# Patient Record
Sex: MALE | Race: AMERICAN INDIAN OR ALASKA NATIVE
[De-identification: names, ages, dates, MRNs, and addresses within clinical notes are randomized per-mention and may not be internally consistent; named-entity substitution may affect disease eponyms.]

---

## 2017-08-12 ENCOUNTER — HOSPITAL ENCOUNTER (EMERGENCY)
Dept: HOSPITAL 31 - C.ER | Age: 37
Discharge: HOME | End: 2017-08-12
Payer: MEDICAID

## 2017-08-12 VITALS
OXYGEN SATURATION: 97 % | HEART RATE: 94 BPM | SYSTOLIC BLOOD PRESSURE: 121 MMHG | DIASTOLIC BLOOD PRESSURE: 80 MMHG | RESPIRATION RATE: 16 BRPM | TEMPERATURE: 98.3 F
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
## 2017-08-12 DIAGNOSIS — F10.129: Primary | ICD-10-CM

## 2017-08-12 NOTE — C.PDOC
History Of Present Illness


Patient presents to the ED for evaluation of acute alcohol intoxication for an 

unknown duration. Patient admits to drinking earlier today. He denies suicidal/

homicidal ideation and has no physical complaints at this time. 


Time Seen by Provider: 08/12/17 02:10


Chief Complaint (Nursing): Substance Abuse


History Per: Patient


History/Exam Limitations: intoxication


Onset/Duration Of Symptoms: Unknown


Current Symptoms Are (Timing): Still Present


Suicide/Self Injury Attempted (Context): None


Modifying Factor(s): Alcohol


Severity: Mild


Pain Scale Rating Of: 3


Associated Symptoms: denies: Suicidal Thoughts, Suicidal Plan


Involuntary Hold By: None


Recent travel outside of the [place]: No


Additional History Per: Patient





Past Medical History


Reviewed: Historical Data, Nursing Documentation, Vital Signs


Vital Signs: 


 Last Vital Signs











Temp  98.2 F   08/12/17 02:09


 


Pulse  110 H  08/12/17 02:09


 


Resp  14   08/12/17 02:09


 


BP  140/70   08/12/17 02:09


 


Pulse Ox  96   08/12/17 04:52














- Medical History


PMH: No Chronic Diseases


Surgical History: No Surg Hx


Family History: States: Unknown Family Hx





Review Of Systems


Constitutional: Positive for: Other (+ETOH intoxication)


Cardiovascular: Negative for: Chest Pain, Palpitations


Respiratory: Negative for: Cough, Shortness of Breath


Gastrointestinal: Negative for: Nausea, Vomiting, Abdominal Pain


Skin: Negative for: Rash, Lesions, Jaundice, Bruising


Neurological: Negative for: Weakness, Numbness


Psych: Negative for: Suicidal ideation





Physical Exam





- Physical Exam


Appears: No Acute Distress, Other (visibly intoxicated )


Skin: Warm, Dry


Head: Normacephalic


Eye(s): bilateral: Normal Inspection


Oral Mucosa: Moist, Other (alcohol on breath )


Neck: Supple


Chest: Symmetrical, No Deformity, No Tenderness


Cardiovascular: Rhythm Regular, No Murmur


Respiratory: No Rales, No Rhonchi, No Wheezing


Extremity: Normal ROM, Capillary Refill (less than 2 seconds )


Neurological/Psych: Other (arousable to touch and verbal stimuli )


Gait: Unsteady





ED Course And Treatment


O2 Sat by Pulse Oximetry: 96 (on RA)


Pulse Ox Interpretation: Normal


Reevaluation Time: 05:14


Reassessment Condition: Improved





Disposition


Counseled Patient/Family Regarding: Studies Performed, Diagnosis, Need For 

Followup





- Disposition


Referrals: 


[facility] at [facility] [Outside]


Disposition Time: 02:12


Condition: FAIR


Instructions:  Alcohol Intoxication (DC)


Forms:  [facility] (English)





- Clinical Impression


Clinical Impression: 


 Alcohol intoxication








- Scribe Statement


The provider has reviewed the documentation as recorded by the Scribe (Micheline Leonard)


Provider Attestation: 








All medical record entries made by the Scribe were at my direction and 

personally dictated by me. I have reviewed the chart and agree that the record 

accurately reflects my personal performance of the history, physical exam, 

medical decision making, and the department course for this patient. I have 

also personally directed, reviewed, and agree with the discharge instructions 

and disposition.

## 2019-02-20 ENCOUNTER — HOSPITAL ENCOUNTER (EMERGENCY)
Dept: HOSPITAL 31 - C.ER | Age: 39
Discharge: HOME | End: 2019-02-20
Payer: COMMERCIAL

## 2019-02-20 VITALS
RESPIRATION RATE: 16 BRPM | DIASTOLIC BLOOD PRESSURE: 63 MMHG | HEART RATE: 68 BPM | TEMPERATURE: 97.7 F | SYSTOLIC BLOOD PRESSURE: 110 MMHG

## 2019-02-20 VITALS — OXYGEN SATURATION: 97 %

## 2019-02-20 DIAGNOSIS — S80.11XA: ICD-10-CM
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
## 2019-02-20 DIAGNOSIS — S80.811A: Primary | ICD-10-CM
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
## 2019-02-20 PROCEDURE — 90471 IMMUNIZATION ADMIN: CPT

## 2019-02-20 PROCEDURE — 90715 TDAP VACCINE 7 YRS/> IM: CPT

## 2019-02-20 PROCEDURE — 99285 EMERGENCY DEPT VISIT HI MDM: CPT

## 2019-02-20 PROCEDURE — 96372 THER/PROPH/DIAG INJ SC/IM: CPT

## 2019-02-20 PROCEDURE — 73590 X-RAY EXAM OF LOWER LEG: CPT

## 2019-02-20 NOTE — C.PDOC
History Of Present Illness


38 year old male presents to the ED for evaluation of right lower leg injury. 

Patient reports that while at work tonight his right leg was caught in the 

middle of steel plates. Patient denies weakness, numbness, LOC, headache. 


Chief Complaint (Nursing): Lower Extremity Problem/Injury


History Per: Patient


History/Exam Limitations: no limitations


Onset/Duration Of Symptoms: Hrs


Current Symptoms Are (Timing): Still Present


Recent travel outside of the [place]: No


Additional History Per: Patient





- Ankle/Foot


Description Of Injury: Struck Against Object


Currently Unable To: Bear Weight, Bend Or Move





Past Medical History


Reviewed: Historical Data, Nursing Documentation, Vital Signs


Vital Signs: 





                                Last Vital Signs











Temp  98.1 F   02/20/19 00:09


 


Pulse  85   02/20/19 00:09


 


Resp  20   02/20/19 00:09


 


BP  133/90   02/20/19 00:09


 


Pulse Ox  99   02/20/19 00:09














- Medical History


PMH: Seizures


Surgical History: No Surg Hx


Family History: States: Unknown Family Hx





- Social History


Hx Alcohol Use: Yes


Hx Substance Use: No





- Immunization History


Hx Tetanus Toxoid Vaccination: No


Hx Influenza Vaccination: No


Hx Pneumococcal Vaccination: No





Review Of Systems


Constitutional: Negative for: Fever, Chills


Respiratory: Negative for: Cough, Shortness of Breath


Gastrointestinal: Negative for: Nausea, Vomiting, Abdominal Pain


Musculoskeletal: Positive for: Leg Pain, Foot Pain


Skin: Positive for: Other (abrasion)


Neurological: Negative for: Weakness, Numbness, Headache, Dizziness





Physical Exam





- Physical Exam


Appears: Non-toxic, No Acute Distress


Skin: Normal Color, Warm, Dry


Head: Atraumatic, Normacephalic


Eye(s): bilateral: Normal Inspection


Neck: Normal ROM, Supple


Chest: Symmetrical


Cardiovascular: Rhythm Regular


Respiratory: Normal Breath Sounds, No Rales, No Rhonchi, No Wheezing


Extremity: No Normal ROM (right leg due to pain), Tenderness (right lower leg 

dorsal aspect), Capillary Refill (< 2 seconds), No Deformity, No Swelling, Other

(4.5 cm abrasion dorsal aspect right lower leg)


Pulses: Left Dorsalis Pedis: Normal, Right Dorsalis Pedis: Normal


Neurological/Psych: Oriented x3, Normal Speech, Normal Cognition, Normal Motor, 

Normal Sensation


Gait: Unable To Assess





ED Course And Treatment


O2 Sat by Pulse Oximetry: 99 (ON RA)


Pulse Ox Interpretation: Normal





- Other Rad


  ** Rt. lower leg


X-Ray: Interpreted by Me, Viewed By Me


Interpretation: NO Fracture





Medical Decision Making


Medical Decision Making: 


Plan:


* Tetanus immunization


* Bacitracin


* Toradol 60 mg IM


* Right leg X-Ray








Disposition


Counseled Patient/Family Regarding: Diagnosis





- Disposition


Referrals: 


[facility] [Outside]


Disposition: HOME/ ROUTINE


Disposition Time: 01:33


Condition: STABLE


Prescriptions: 


Naproxen 375 mg PO TIDPC #20 tablet


Instructions:  Wound Care (DC), Tdap Vaccine


Forms:  [facility] (English)





- POA


Present On Arrival: None





- Clinical Impression


Clinical Impression: 


 Abrasion, Contusion of lower leg, right








- Scribe Statement


The provider has reviewed the documentation as recorded by the Scribe


Rajiv Barker





All medical record entries made by the Scribe were at my direction and pe

rsonally dictated by me. I have reviewed the chart and agree that the record 

accurately reflects my personal performance of the history, physical exam, 

medical decision making, and the department course for this patient. I have also

 personally directed, reviewed, and agree with the discharge instructions and 

disposition.

## 2019-02-20 NOTE — RAD
Date of service: 



02/20/2019



PROCEDURE:  Radiographs of the right tibia and fibula.



HISTORY:

injury/ pain



COMPARISON:

None available



TECHNIQUE:

Frontal and lateral views obtained.



FINDINGS:



BONES:

No fracture identified.  No dislocation seen.



JOINT SPACES:

Bony articulations appear maintained.



OTHER FINDINGS:

None.



IMPRESSION:

No fracture or dislocation identified.